# Patient Record
Sex: FEMALE | Race: BLACK OR AFRICAN AMERICAN | NOT HISPANIC OR LATINO | Employment: FULL TIME | RURAL
[De-identification: names, ages, dates, MRNs, and addresses within clinical notes are randomized per-mention and may not be internally consistent; named-entity substitution may affect disease eponyms.]

---

## 2022-04-18 ENCOUNTER — CLINICAL SUPPORT (OUTPATIENT)
Dept: PRIMARY CARE CLINIC | Facility: CLINIC | Age: 22
End: 2022-04-18

## 2022-04-18 DIAGNOSIS — Z11.1 SCREENING-PULMONARY TB: Primary | ICD-10-CM

## 2022-04-18 PROCEDURE — 86580 TB INTRADERMAL TEST: CPT | Mod: ,,, | Performed by: NURSE PRACTITIONER

## 2022-04-18 PROCEDURE — 86580 POCT TB SKIN TEST: ICD-10-PCS | Mod: ,,, | Performed by: NURSE PRACTITIONER

## 2022-04-18 NOTE — PROGRESS NOTES
Subjective:       Patient ID: Grisel Yanez is a 21 y.o. female.    Chief Complaint: No chief complaint on file.    HPI  Review of Systems      Objective:      Physical Exam    Assessment:       Problem List Items Addressed This Visit    None     Visit Diagnoses     Screening-pulmonary TB    -  Primary    Relevant Orders    POCT TB Skin Test (Completed)          Plan:       TB skin test only

## 2022-04-20 LAB
TB INDURATION - 48 HR READ: 0 MM
TB INDURATION - 72 HR READ: NORMAL
TB SKIN TEST - 48 HR READ: NEGATIVE
TB SKIN TEST - 72 HR READ: NORMAL

## 2022-08-17 ENCOUNTER — HOSPITAL ENCOUNTER (EMERGENCY)
Age: 22
Discharge: HOME | End: 2022-08-17
Attending: EMERGENCY MEDICINE

## 2022-08-17 VITALS
RESPIRATION RATE: 16 BRPM | SYSTOLIC BLOOD PRESSURE: 129 MMHG | TEMPERATURE: 99.1 F | OXYGEN SATURATION: 100 % | HEIGHT: 62 IN | DIASTOLIC BLOOD PRESSURE: 84 MMHG | BODY MASS INDEX: 22.63 KG/M2 | HEART RATE: 119 BPM | WEIGHT: 123 LBS

## 2022-08-17 DIAGNOSIS — M79.622 LEFT UPPER ARM PAIN: ICD-10-CM

## 2022-08-17 DIAGNOSIS — Z48.89 ENCOUNTER FOR POST SURGICAL WOUND CHECK: Primary | ICD-10-CM

## 2022-08-17 PROCEDURE — 99284 EMERGENCY DEPT VISIT MOD MDM: CPT

## 2022-08-17 PROCEDURE — 96372 THER/PROPH/DIAG INJ SC/IM: CPT

## 2022-08-17 PROCEDURE — 6360000002 HC RX W HCPCS: Performed by: EMERGENCY MEDICINE

## 2022-08-17 RX ORDER — KETOROLAC TROMETHAMINE 15 MG/ML
15 INJECTION, SOLUTION INTRAMUSCULAR; INTRAVENOUS
Status: COMPLETED | OUTPATIENT
Start: 2022-08-17 | End: 2022-08-17

## 2022-08-17 RX ADMIN — KETOROLAC TROMETHAMINE 15 MG: 15 INJECTION, SOLUTION INTRAMUSCULAR; INTRAVENOUS at 02:24

## 2022-08-17 ASSESSMENT — PAIN SCALES - GENERAL
PAINLEVEL_OUTOF10: 10
PAINLEVEL_OUTOF10: 10

## 2022-08-17 ASSESSMENT — PAIN DESCRIPTION - DESCRIPTORS: DESCRIPTORS: ACHING

## 2022-08-17 ASSESSMENT — PAIN DESCRIPTION - LOCATION
LOCATION: ARM
LOCATION: ARM

## 2022-08-17 ASSESSMENT — PAIN - FUNCTIONAL ASSESSMENT: PAIN_FUNCTIONAL_ASSESSMENT: 0-10

## 2022-08-17 ASSESSMENT — PAIN DESCRIPTION - ORIENTATION: ORIENTATION: LEFT

## 2022-08-17 ASSESSMENT — ENCOUNTER SYMPTOMS: COLOR CHANGE: 0

## 2022-08-17 NOTE — DISCHARGE INSTRUCTIONS
Ice the area for 15 minutes every 4 hours while awake for 2 to 3 days. Tylenol 500 mg alternated with 400 mg of Motrin or Advil or ibuprofen every 3-4 hours. Call your OB/GYN this morning for follow-up and recheck. Return if redness warmth or pus from the wound.

## 2022-08-17 NOTE — ED TRIAGE NOTES
Pt. Phoenix Telles to triage. Pt c/o left arm pain. Pt. Astrid states pt. Received new nexplanon birthcontrol implant yesterday and has had swelling and pain since.

## 2022-08-17 NOTE — ED NOTES
Presents with pain to the left upper arm where she had her birthcontrol inserted      Kiki Yates RN  08/17/22 [id number]

## 2022-08-17 NOTE — ED PROVIDER NOTES
[facility] Emergency Department Provider Note                   PCP:                None Provider               Age: 19 y.o. Sex: female       ICD-10-CM    1. Encounter for post surgical wound check  Z48.89       2. Left upper arm pain  M79.622           DISPOSITION          MDM  Number of Diagnoses or Management Options  Encounter for post surgical wound check  Left upper arm pain  Diagnosis management comments: I do not believe any x-ray imaging is warranted. Pain control with IM Toradol. Patient instructed to use some ice to the area for pain and swelling. Patient also instructed to alternate Tylenol and Motrin every 3-4 hours to stay on top of the pain. Call her OB/GYN in the morning for further instructions. Risk of Complications, Morbidity, and/or Mortality  Presenting problems: low  Diagnostic procedures: minimal  Management options: minimal    Patient Progress  Patient progress: improved       No orders of the defined types were placed in this encounter. Adrian Rodas is a 19 y.o. female who presents to the Emergency Department with chief complaint of    Chief Complaint   Patient presents with    Arm Pain      1year-old female presents with left arm pain in her upper arm where her birth control implant was placed yesterday. She went to school today and did CPR as part of a class and has had increased pain in her upper arm since that time. She has not taken ibuprofen for at least 12 hours. She has not taken anything else for pain. No significant increase in swelling and no warmth or redness. No numbness or tingling. Review of Systems   Constitutional:  Negative for chills and fever. Skin:  Positive for wound. Negative for color change. Neurological:  Negative for weakness and numbness. No past medical history on file. No past surgical history on file. No family history on file.      Social History     Socioeconomic History    Marital status: Single         Ann Marie [peanut-containing drug products]     Previous Medications    No medications on file        Vitals signs and nursing note reviewed. Patient Vitals for the past 4 hrs:   Temp Pulse Resp BP SpO2   08/17/22 0058 99.1 °F (37.3 °C) (!) 119 16 129/84 100 %          Physical Exam  Vitals and nursing note reviewed. Constitutional:       Appearance: Normal appearance. She is not ill-appearing or toxic-appearing. Musculoskeletal:      Comments: There is localized tenderness underneath the Steri-Strips where the implant was placed. There is no warmth erythema or purulent discharge. There is no significant induration or fluctuance. Left upper extremity is neurovascularly intact with 2+ radial pulses present. Motor and sensation intact. Skin:     General: Skin is warm and dry. Neurological:      Mental Status: She is alert. Procedures      Labs Reviewed - No data to display     No orders to display                          Voice dictation software was used during the making of this note. This software is not perfect and grammatical and other typographical errors may be present. This note has not been completely proofread for errors.      Tru Schmitz MD  08/17/22 [id number]

## 2022-08-17 NOTE — ED NOTES
I have reviewed discharge instructions with the patient. The patient verbalized understanding. Patient left ED via Discharge Method: ambulatory to Home with friend. Opportunity for questions and clarification provided. Patient given 0 scripts. To continue your aftercare when you leave the hospital, you may receive an automated call from our care team to check in on how you are doing. This is a free service and part of our promise to provide the best care and service to meet your aftercare needs.  If you have questions, or wish to unsubscribe from this service please call [phone]. Thank you for Choosing our [facility] Emergency Department.         Oralia Montero RN  08/17/22 0540